# Patient Record
Sex: MALE
[De-identification: names, ages, dates, MRNs, and addresses within clinical notes are randomized per-mention and may not be internally consistent; named-entity substitution may affect disease eponyms.]

---

## 2018-04-10 ENCOUNTER — HOSPITAL (OUTPATIENT)
Dept: OTHER | Age: 28
End: 2018-04-10
Attending: INTERNAL MEDICINE

## 2018-04-10 LAB
ANALYZER ANC (IANC): ABNORMAL
ANION GAP SERPL CALC-SCNC: 16 MMOL/L (ref 10–20)
BASOPHILS # BLD: 0 THOUSAND/MCL (ref 0–0.3)
BASOPHILS NFR BLD: 0 %
BUN SERPL-MCNC: 9 MG/DL (ref 6–20)
BUN/CREAT SERPL: 11 (ref 7–25)
CALCIUM SERPL-MCNC: 9.2 MG/DL (ref 8.4–10.2)
CHLORIDE: 99 MMOL/L (ref 98–107)
CO2 SERPL-SCNC: 26 MMOL/L (ref 21–32)
CREAT SERPL-MCNC: 0.8 MG/DL (ref 0.67–1.17)
DIFFERENTIAL METHOD BLD: ABNORMAL
EOSINOPHIL # BLD: 0 THOUSAND/MCL (ref 0.1–0.5)
EOSINOPHIL NFR BLD: 0 %
ERYTHROCYTE [DISTWIDTH] IN BLOOD: 12.6 % (ref 11–15)
GLUCOSE BLDC GLUCOMTR-MCNC: 186 MG/DL (ref 65–99)
GLUCOSE BLDC GLUCOMTR-MCNC: 200 MG/DL (ref 65–99)
GLUCOSE BLDC GLUCOMTR-MCNC: 228 MG/DL (ref 65–99)
GLUCOSE SERPL-MCNC: 263 MG/DL (ref 65–99)
HEMATOCRIT: 41.5 % (ref 39–51)
HGB BLD-MCNC: 14.3 GM/DL (ref 13–17)
IMM GRANULOCYTES # BLD AUTO: 0.1 THOUSAND/MCL (ref 0–0.2)
IMM GRANULOCYTES NFR BLD: 1 %
LYMPHOCYTES # BLD: 1.5 THOUSAND/MCL (ref 1–4.8)
LYMPHOCYTES NFR BLD: 8 %
MCH RBC QN AUTO: 28.5 PG (ref 26–34)
MCHC RBC AUTO-ENTMCNC: 34.5 GM/DL (ref 32–36.5)
MCV RBC AUTO: 82.8 FL (ref 78–100)
MONOCYTES # BLD: 0.8 THOUSAND/MCL (ref 0.3–0.9)
MONOCYTES NFR BLD: 4 %
NEUTROPHILS # BLD: 16.1 THOUSAND/MCL (ref 1.8–7.7)
NEUTROPHILS NFR BLD: 87 %
NEUTS SEG NFR BLD: ABNORMAL %
PERCENT NRBC: 0 %
PLATELET # BLD: 287 THOUSAND/MCL (ref 140–450)
POTASSIUM SERPL-SCNC: 3.8 MMOL/L (ref 3.4–5.1)
RBC # BLD: 5.01 MILLION/MCL (ref 4.5–5.9)
SODIUM SERPL-SCNC: 137 MMOL/L (ref 135–145)
WBC # BLD: 18.6 THOUSAND/MCL (ref 4.2–11)

## 2021-11-29 ENCOUNTER — HOSPITAL ENCOUNTER (OUTPATIENT)
Age: 31
Discharge: HOME | End: 2021-11-29
Payer: COMMERCIAL

## 2021-11-29 ENCOUNTER — APPOINTMENT (OUTPATIENT)
Dept: GENERAL RADIOLOGY | Age: 31
End: 2021-11-29
Attending: NURSE PRACTITIONER
Payer: COMMERCIAL

## 2021-11-29 VITALS
RESPIRATION RATE: 18 BRPM | HEART RATE: 71 BPM | HEIGHT: 71 IN | TEMPERATURE: 98 F | SYSTOLIC BLOOD PRESSURE: 142 MMHG | OXYGEN SATURATION: 99 % | BODY MASS INDEX: 26.6 KG/M2 | WEIGHT: 190 LBS | DIASTOLIC BLOOD PRESSURE: 85 MMHG

## 2021-11-29 DIAGNOSIS — R07.9 ACUTE CHEST PAIN: Primary | ICD-10-CM

## 2021-11-29 PROCEDURE — 84484 ASSAY OF TROPONIN QUANT: CPT | Performed by: NURSE PRACTITIONER

## 2021-11-29 PROCEDURE — 71046 X-RAY EXAM CHEST 2 VIEWS: CPT | Performed by: NURSE PRACTITIONER

## 2021-11-29 PROCEDURE — 99214 OFFICE O/P EST MOD 30 MIN: CPT | Performed by: NURSE PRACTITIONER

## 2021-11-29 PROCEDURE — 80047 BASIC METABLC PNL IONIZED CA: CPT | Performed by: NURSE PRACTITIONER

## 2021-11-29 PROCEDURE — 93000 ELECTROCARDIOGRAM COMPLETE: CPT | Performed by: NURSE PRACTITIONER

## 2021-11-29 PROCEDURE — 85025 COMPLETE CBC W/AUTO DIFF WBC: CPT | Performed by: NURSE PRACTITIONER

## 2021-11-29 RX ORDER — QUETIAPINE 200 MG/1
200 TABLET, FILM COATED ORAL NIGHTLY
COMMUNITY

## 2021-11-29 NOTE — ED PROVIDER NOTES
Patient Seen in: [facility]      History   Patient presents with:  Chest Pain    Stated Complaint: chest pain    Subjective:   HPI    79-year-old male, with a history of anxiety, depression, diabetes, presents to the immediate care with inte Appearance: He is well-developed. He is obese. HENT:      Head: Normocephalic. Eyes:      Conjunctiva/sclera: Conjunctivae normal.   Cardiovascular:      Rate and Rhythm: Normal rate. Heart sounds: Normal heart sounds. No murmur heard.       Pulmon an appointment as soon as possible for a visit       [address] [address]  [phone]              Medications Prescribed:  Discharge Medication List as of 11/29/2021  4:41 PM

## 2024-10-06 ENCOUNTER — APPOINTMENT (OUTPATIENT)
Dept: GENERAL RADIOLOGY | Age: 34
End: 2024-10-06
Attending: NURSE PRACTITIONER
Payer: COMMERCIAL

## 2024-10-06 ENCOUNTER — HOSPITAL ENCOUNTER (OUTPATIENT)
Age: 34
Discharge: HOME | End: 2024-10-06
Payer: COMMERCIAL

## 2024-10-06 VITALS
HEART RATE: 70 BPM | RESPIRATION RATE: 18 BRPM | SYSTOLIC BLOOD PRESSURE: 127 MMHG | TEMPERATURE: 98 F | OXYGEN SATURATION: 99 % | DIASTOLIC BLOOD PRESSURE: 80 MMHG

## 2024-10-06 DIAGNOSIS — R07.81 RIB PAIN ON RIGHT SIDE: ICD-10-CM

## 2024-10-06 DIAGNOSIS — E11.65 TYPE 2 DIABETES MELLITUS WITH HYPERGLYCEMIA, WITHOUT LONG-TERM CURRENT USE OF INSULIN (HCC): ICD-10-CM

## 2024-10-06 DIAGNOSIS — S20.211A CONTUSION OF RIB ON RIGHT SIDE, INITIAL ENCOUNTER: Primary | ICD-10-CM

## 2024-10-06 LAB — GLUCOSE BLDC GLUCOMTR-MCNC: 218 MG/DL (ref 70–99)

## 2024-10-06 PROCEDURE — 82962 GLUCOSE BLOOD TEST: CPT | Performed by: NURSE PRACTITIONER

## 2024-10-06 PROCEDURE — 71101 X-RAY EXAM UNILAT RIBS/CHEST: CPT | Performed by: NURSE PRACTITIONER

## 2024-10-06 PROCEDURE — 99214 OFFICE O/P EST MOD 30 MIN: CPT | Performed by: NURSE PRACTITIONER

## 2024-10-06 RX ORDER — NAPROXEN 500 MG/1
500 TABLET ORAL 2 TIMES DAILY PRN
Qty: 20 TABLET | Refills: 0 | Status: SHIPPED | OUTPATIENT
Start: 2024-10-06 | End: 2024-10-13

## 2024-10-06 NOTE — DISCHARGE INSTRUCTIONS
As we discussed your chest x-ray does not show a rib fracture or other abnormality of the chest.  This is a contusion to the chest wall and most likely the rib.  Take the anti-inflammatory as prescribed.  Do not take any additional ibuprofen products while taking the naproxen.  You may take 1000 mg of Tylenol every 8 hours as needed for pain.  Cough and deep breathe while splinting the area as shown several times each day.  Your blood sugar is elevated, schedule a follow-up appointment with your primary care provider to discuss your diabetes.  Thank you for choosing our [facility] for your medical condition today. Please be sure to follow up with your primary care provider in 2 days if no improvement, or as directed. If any worsening of your symptoms or other concerns call your primary care provider, return here or go to the emergency department.

## 2024-10-06 NOTE — ED PROVIDER NOTES
Patient Seen in: [facility]      History     Chief Complaint   Patient presents with    Back Pain     Stated Complaint: side/rib pain WC    Subjective:   This is a 34-year-old  male who presents to the immediate care with a chief complaint of ongoing right chest wall and what he believes is rib pain since September 27.  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  Patient states that he immediately felt pain and heard or felt a crack in the right lower leg lateral posterior ribs.  Patient states he has been taking ibuprofen for ongoing discomfort in this area without significant relief.  He denies any other chest pain or shortness of breath or palpitations.  He denies noting any bruising subsequent to the injury.  He denies any other complaints.  A history of type 2 diabetes, fatty liver disease and anxiety is noted.  Patient states he last had an A1c checked in the beginning of this year and does not check his blood sugars regularly.  He denies any new onset of polydipsia or polyuria            Objective:     Past Medical History:    Anxiety    Depression    Diabetes (HCC)    Fatty liver disease, nonalcoholic              History reviewed. No pertinent surgical history.             Social History     Socioeconomic History    Marital status: Single   Tobacco Use    Smoking status: Never    Smokeless tobacco: Never   Vaping Use    Vaping status: Never Used   Substance and Sexual Activity    Alcohol use: Yes     Alcohol/week: 0.0 standard drinks of alcohol     Comment: binge drinks ETOH x1 a week    Drug use: Yes     Types: Cannabis              Review of Systems    Positive for stated complaint: side/rib pain WC  Other systems are as noted in HPI.  Constitutional and vital signs reviewed.      All other systems reviewed and  negative except as noted above.    Physical Exam     ED Triage Vitals [10/06/24 1013]   /80   Pulse 70   Resp 18   Temp 97.9 °F (36.6 °C)   Temp src Temporal   SpO2 99 %   O2 Device None (Room air)       Current Vitals:   Vital Signs  BP: 127/80  Pulse: 70  Resp: 18  Temp: 97.9 °F (36.6 °C)  Temp src: Temporal    Oxygen Therapy  SpO2: 99 %  O2 Device: None (Room air)        Physical Exam  Constitutional:       General: He is not in acute distress.     Appearance: Normal appearance. He is normal weight. He is not ill-appearing or toxic-appearing.   HENT:      Head: Normocephalic.   Eyes:      Conjunctiva/sclera: Conjunctivae normal.   Cardiovascular:      Rate and Rhythm: Normal rate and regular rhythm.      Heart sounds: Normal heart sounds. No murmur heard.     No friction rub.   Pulmonary:      Effort: Pulmonary effort is normal. No respiratory distress.      Breath sounds: Normal breath sounds. No wheezing, rhonchi or rales.   Chest:      Chest wall: No tenderness.   Abdominal:      General: There is no distension.      Tenderness: There is no abdominal tenderness. There is no guarding.   Musculoskeletal:         General: Tenderness and signs of injury present.      Comments: Chest wall tender to palpation mid to lower anterior to lateral right ribs.  No obvious deformity, no step defect, no crepitus, no ecchymosis.  Thoracic cage mildly tender to compression.  No tenderness over cervical thoracic or lumbar spine.   Skin:     General: Skin is warm and dry.      Findings: No bruising or erythema.   Neurological:      General: No focal deficit present.      Mental Status: He is alert and oriented to person, place, and time.   Psychiatric:         Mood and Affect: Mood normal.         Behavior: Behavior normal.         Thought Content: Thought content normal.         Judgment: Judgment normal.             ED Course     Labs Reviewed   POCT GLUCOSE - Abnormal; Notable for the following components:       Result  Value    POC Glucose  218 (*)     All other components within normal limits     Discussed with patient chest x-ray and rib films that do not show any fracture.  Patient advised regarding coughing and deep breathing with splinting and a demonstration was provided.  Patient was advised to take Tylenol 1000 mg every 8 hours as needed for pain while taking the naproxen 500 mg twice a day with food.  He was also cautioned not to take any additional ibuprofen products.  The patient was also advised of the elevated blood sugar he states he has not eaten this morning.  He was strongly encouraged to schedule an appointment with his primary care provider to follow-up on the hyperglycemia as well as the musculoskeletal chest wall injury.  Patient agrees with this plan of care.  He was given a note for light duty for the next 48 hours.          MDM         Medical Decision Making      Disposition and Plan     Clinical Impression:  1. Contusion of rib on right side, initial encounter    2. Rib pain on right side    3. Type 2 diabetes mellitus with hyperglycemia, without long-term current use of insulin (HCC)         Disposition:  Discharge  10/6/2024 11:21 am    Follow-up:  Moi Partida  [address] [facility] [address]  [phone]    In 2 days            Medications Prescribed:  Discharge Medication List as of 10/6/2024 11:23 AM        START taking these medications    Details   naproxen 500 MG Oral Tab Take 1 tablet (500 mg total) by mouth 2 (two) times daily as needed., Normal, Disp-20 tablet, R-0                 Supplementary Documentation:

## 2024-10-06 NOTE — ED INITIAL ASSESSMENT (HPI)
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  Pt states pain has gotten worse everyday. Pt attempted ibuprofen which does help .

## (undated) NOTE — LETTER
Date & Time: 10/6/2024, 11:27 AM  Patient: Aren Giraldo  Encounter Provider(s):    Simin Hernandez APRN       To Whom It May Concern:    Aren Giraldo was seen and treated in our department on 10/6/2024. He can return to work with these limitations: light duty until 10/9/24 .    If you have any questions or concerns, please do not hesitate to call.        _____________________________  Physician/APC Signature